# Patient Record
Sex: MALE | Race: ASIAN
[De-identification: names, ages, dates, MRNs, and addresses within clinical notes are randomized per-mention and may not be internally consistent; named-entity substitution may affect disease eponyms.]

---

## 2019-01-29 ENCOUNTER — HOSPITAL ENCOUNTER (INPATIENT)
Dept: HOSPITAL 63 - ER | Age: 59
LOS: 3 days | Discharge: HOME | DRG: 193 | End: 2019-02-01
Attending: INTERNAL MEDICINE | Admitting: INTERNAL MEDICINE
Payer: SELF-PAY

## 2019-01-29 VITALS — BODY MASS INDEX: 27.97 KG/M2 | HEIGHT: 62 IN | WEIGHT: 152 LBS

## 2019-01-29 VITALS — SYSTOLIC BLOOD PRESSURE: 136 MMHG | DIASTOLIC BLOOD PRESSURE: 64 MMHG

## 2019-01-29 VITALS — SYSTOLIC BLOOD PRESSURE: 154 MMHG | DIASTOLIC BLOOD PRESSURE: 83 MMHG

## 2019-01-29 VITALS — SYSTOLIC BLOOD PRESSURE: 138 MMHG | DIASTOLIC BLOOD PRESSURE: 77 MMHG

## 2019-01-29 DIAGNOSIS — Z87.891: ICD-10-CM

## 2019-01-29 DIAGNOSIS — J96.22: ICD-10-CM

## 2019-01-29 DIAGNOSIS — M10.9: ICD-10-CM

## 2019-01-29 DIAGNOSIS — M41.9: ICD-10-CM

## 2019-01-29 DIAGNOSIS — N17.9: ICD-10-CM

## 2019-01-29 DIAGNOSIS — J45.901: ICD-10-CM

## 2019-01-29 DIAGNOSIS — I13.0: ICD-10-CM

## 2019-01-29 DIAGNOSIS — E78.5: ICD-10-CM

## 2019-01-29 DIAGNOSIS — J96.21: ICD-10-CM

## 2019-01-29 DIAGNOSIS — J44.1: ICD-10-CM

## 2019-01-29 DIAGNOSIS — I50.9: ICD-10-CM

## 2019-01-29 DIAGNOSIS — Z82.3: ICD-10-CM

## 2019-01-29 DIAGNOSIS — J44.0: ICD-10-CM

## 2019-01-29 DIAGNOSIS — N18.9: ICD-10-CM

## 2019-01-29 DIAGNOSIS — J18.9: Primary | ICD-10-CM

## 2019-01-29 DIAGNOSIS — Z80.42: ICD-10-CM

## 2019-01-29 LAB
ALBUMIN SERPL-MCNC: 3.6 G/DL (ref 3.4–5)
ALBUMIN/GLOB SERPL: 1 {RATIO} (ref 1–1.7)
ALP SERPL-CCNC: 58 U/L (ref 46–116)
ALT SERPL-CCNC: 39 U/L (ref 16–63)
ANION GAP SERPL CALC-SCNC: 7 MMOL/L (ref 6–14)
APTT PPP: YELLOW S
AST SERPL-CCNC: 19 U/L (ref 15–37)
BACTERIA #/AREA URNS HPF: 0 /HPF
BASOPHILS # BLD AUTO: 0 X10^3/UL (ref 0–0.2)
BASOPHILS NFR BLD: 0 % (ref 0–3)
BGAS PCO2: 48 MMHG (ref 35–46)
BGAS PH: 7.37 (ref 7.35–7.46)
BGAS PO2: 74 MMHG (ref 80–100)
BILIRUB SERPL-MCNC: 0.6 MG/DL (ref 0.2–1)
BILIRUB UR QL STRIP: (no result)
BUN/CREAT SERPL: 21 (ref 6–20)
CA-I SERPL ISE-MCNC: 30 MG/DL (ref 8–26)
CALCIUM SERPL-MCNC: 8.2 MG/DL (ref 8.5–10.1)
CHLORIDE SERPL-SCNC: 97 MMOL/L (ref 98–107)
CO2 SERPL-SCNC: 30 MMOL/L (ref 21–32)
CREAT SERPL-MCNC: 1.4 MG/DL (ref 0.7–1.3)
DELTA BASE BGAS: 3 MMOL/L (ref 0–3)
EOSINOPHIL NFR BLD: 0.1 X10^3/UL (ref 0–0.7)
EOSINOPHIL NFR BLD: 1 % (ref 0–3)
ERYTHROCYTE [DISTWIDTH] IN BLOOD BY AUTOMATED COUNT: 14.2 % (ref 11.5–14.5)
FIBRINOGEN PPP-MCNC: CLEAR MG/DL
GFR SERPLBLD BASED ON 1.73 SQ M-ARVRAT: 51.9 ML/MIN
GLOBULIN SER-MCNC: 3.5 G/DL (ref 2.2–3.8)
GLUCOSE SERPL-MCNC: 131 MG/DL (ref 70–99)
GLUCOSE UR STRIP-MCNC: (no result) MG/DL
HCT VFR BLD CALC: 52.1 % (ref 39–53)
HGB BLD-MCNC: 17.1 G/DL (ref 13–17.5)
INFLUENZA A PATIENT: NEGATIVE
INFLUENZA B PATIENT: NEGATIVE
LYMPHOCYTES # BLD: 0.4 X10^3/UL (ref 1–4.8)
LYMPHOCYTES NFR BLD AUTO: 4 % (ref 24–48)
MCH RBC QN AUTO: 29 PG (ref 25–35)
MCHC RBC AUTO-ENTMCNC: 33 G/DL (ref 31–37)
MCV RBC AUTO: 90 FL (ref 79–100)
MONO #: 0.7 X10^3/UL (ref 0–1.1)
MONOCYTES NFR BLD: 6 % (ref 0–9)
NEUT #: 9.5 X10^3UL (ref 1.8–7.7)
NEUTROPHILS NFR BLD AUTO: 89 % (ref 31–73)
NITRITE UR QL STRIP: (no result)
O2 SAT BGAS: 94 % (ref 92–99)
O2/TOTAL GAS SETTING VFR VENT: 21 %
PLATELET # BLD AUTO: 255 X10^3/UL (ref 140–400)
POTASSIUM SERPL-SCNC: 4 MMOL/L (ref 3.5–5.1)
PROT SERPL-MCNC: 7.1 G/DL (ref 6.4–8.2)
RBC # BLD AUTO: 5.82 X10^6/UL (ref 4.3–5.7)
RBC #/AREA URNS HPF: 0 /HPF (ref 0–2)
SODIUM SERPL-SCNC: 134 MMOL/L (ref 136–145)
SP GR UR STRIP: 1.01
SQUAMOUS #/AREA URNS LPF: (no result) /LPF
UROBILINOGEN UR-MCNC: 0.2 MG/DL
WBC # BLD AUTO: 10.7 X10^3/UL (ref 4–11)
WBC #/AREA URNS HPF: (no result) /HPF (ref 0–4)

## 2019-01-29 PROCEDURE — 96375 TX/PRO/DX INJ NEW DRUG ADDON: CPT

## 2019-01-29 PROCEDURE — 84484 ASSAY OF TROPONIN QUANT: CPT

## 2019-01-29 PROCEDURE — 85025 COMPLETE CBC W/AUTO DIFF WBC: CPT

## 2019-01-29 PROCEDURE — 80048 BASIC METABOLIC PNL TOTAL CA: CPT

## 2019-01-29 PROCEDURE — 82550 ASSAY OF CK (CPK): CPT

## 2019-01-29 PROCEDURE — 93005 ELECTROCARDIOGRAM TRACING: CPT

## 2019-01-29 PROCEDURE — 82803 BLOOD GASES ANY COMBINATION: CPT

## 2019-01-29 PROCEDURE — 87641 MR-STAPH DNA AMP PROBE: CPT

## 2019-01-29 PROCEDURE — 80053 COMPREHEN METABOLIC PANEL: CPT

## 2019-01-29 PROCEDURE — 94640 AIRWAY INHALATION TREATMENT: CPT

## 2019-01-29 PROCEDURE — 83880 ASSAY OF NATRIURETIC PEPTIDE: CPT

## 2019-01-29 PROCEDURE — 87040 BLOOD CULTURE FOR BACTERIA: CPT

## 2019-01-29 PROCEDURE — 36415 COLL VENOUS BLD VENIPUNCTURE: CPT

## 2019-01-29 PROCEDURE — 96374 THER/PROPH/DIAG INJ IV PUSH: CPT

## 2019-01-29 PROCEDURE — 83605 ASSAY OF LACTIC ACID: CPT

## 2019-01-29 PROCEDURE — 87804 INFLUENZA ASSAY W/OPTIC: CPT

## 2019-01-29 PROCEDURE — 87070 CULTURE OTHR SPECIMN AEROBIC: CPT

## 2019-01-29 PROCEDURE — 71045 X-RAY EXAM CHEST 1 VIEW: CPT

## 2019-01-29 PROCEDURE — 81001 URINALYSIS AUTO W/SCOPE: CPT

## 2019-01-29 PROCEDURE — 87205 SMEAR GRAM STAIN: CPT

## 2019-01-29 RX ADMIN — IPRATROPIUM BROMIDE AND ALBUTEROL SULFATE SCH ML: .5; 3 SOLUTION RESPIRATORY (INHALATION) at 20:24

## 2019-01-29 RX ADMIN — METHYLPREDNISOLONE SODIUM SUCCINATE SCH MG: 40 INJECTION, POWDER, FOR SOLUTION INTRAMUSCULAR; INTRAVENOUS at 21:02

## 2019-01-29 RX ADMIN — AZITHROMYCIN SCH MG: 250 TABLET, FILM COATED ORAL at 17:30

## 2019-01-29 NOTE — HP
ADMIT DATE:  2019



HISTORY OF PRESENT ILLNESS:  The patient is a 58-year-old Sami male patient

who was brought to the Emergency Room by his family as he started complaining of

increasing shortness of breath, cough with yellowish sputum.  He was admitted

recently with acute hypoxic respiratory failure due to community-acquired

pneumonia, was found to have severe emphysema and bronchiectasis together

probably restrictive lung disease due to kyphoscoliosis and was discharged home

on a tapering course of steroids and Levaquin.



PAST MEDICAL HISTORY:  Significant for hypertension, hyperlipidemia as well as

chronic obstructive pulmonary disease, acute-on-chronic hypoxic hypercapnic

respiratory failure, bronchiectasis and severe emphysema as well as gout.



PAST SURGICAL HISTORY:  Unremarkable.



ALLERGIES:  He has no known drug allergies.



CURRENT MEDICATIONS:  On discharge, he was discharged on furosemide 40 mg once a

day, Mucinex 600 mg twice a day, Levaquin 500 mg once a day, potassium chloride

20 mEq once a day and prednisone 40 mg in a tapering course.



FAMILY HISTORY:  Significant for the fact that he has 2 brothers, one of them

has prostate cancer.  The other was healthy.  Two sisters.  His mother  in

her 80s due to CVA.



SOCIAL HISTORY:  Single, never , has no children.  He smoked a pack a day

for 25 years, quit about 15 years ago.  He does not drink alcohol or

recreational drugs.  He apparently has severe scoliosis and was on disability. 

Tuberculosis was suspected before, but further investigation proved that he has

no tuberculosis according to him.



REVIEW OF SYSTEMS:  As per history of present illness.



PHYSICAL EXAMINATION:

GENERAL:  On arrival to the Emergency Room today, he was slightly tachypneic,

but no pallor, jaundice or cyanosis.  No lymphadenopathy, no thyromegaly.  No

jugular venous distention.  No lower limb edema.

VITAL SIGNS:  His heart rate was 76, blood pressure 154/83, temperature was

98.3, respiratory rate was 36, and oxygen saturation on arrival was 88% on room

air that has improved to 96% on 2 liters of oxygen.

HEAD, EYES, EARS, NOSE AND THROAT:  Showed normocephalic, atraumatic.

NECK:  Supple.

HEART:  Showed normal first and second heart sounds.  No gallop or murmur.

CHEST:  Shows central trachea.  The patient has barrel chest with a markedly

reduced chest expansion, reduced air entry, vesicular breath sounds with

crepitation on both bases bilaterally.  Very few scattered rhonchi.

ABDOMEN:  Distended, soft, nontender.

NEUROLOGIC:  He is awake, alert, responding appropriately.  All cranial nerves

intact.

EXTREMITIES:  He moves extremities without difficulty, ambulates without

assistance or assistive devices.



LABORATORY DATA:  On arrival showed a white cell count of 10,700, hemoglobin 17,

hematocrit 52, MCV 90 and platelet count of 255,000.  His serum sodium was 134,

potassium 4, chloride 97, bicarbonate 30, anion gap of 7, BUN 30, creatinine

1.4, estimated GFR was 52 mL per minute, his glucose 131, calcium was 8.2,

lactic acid was only 1.3.  Total bilirubin, AST, ALT, alkaline phosphatase were

normal.  His total protein was 7.1, albumin 3.6.  His blood gases today showed a

pH of 7.37, pCO2 of 48, pO2 of 74, bicarbonate 28, and oxygen saturation was

94%.  Urinalysis was unremarkable and his influenza A and B were negative.  Her

chest x-ray showed that the patient's cardiomediastinal silhouette is normal. 

____ bilateral hilar prominence is unchanged.  Adenopathy noted on prior CT

scan.  Central pulmonary vessels are also prominent.  Lungs are clear.  There is

no pneumothorax, no pleural effusion is appreciated.  No acute bone abnormality.



IMPRESSION:  In summary, this is a 58-year-old Sami male patient who yet again

came would probably acute hypoxic hypercapnic respiratory failure, chronic

obstructive pulmonary disease exacerbation, has severe emphysema and

bronchiectasis, hypertension, hyperlipidemia and gout.  We will continue with IV

antibiotic in the form of Zithromax and Rocephin.  Continue with Solu-Medrol,

nebulized albuterol and Atrovent.  Continue with all his other medications.





______________________________

SINDHU DOUGHERTY MD



DR:  GIANCARLO/davi  JOB#:  [id number] / [id number]

DD:  2019 16:57  DT:  2019 18:15

## 2019-01-29 NOTE — EKG
[facility] [address], [place] [address]

Test Date:    2019               Test Time:    15:12:14

Pat Name:     REGGIE FUENTES              Department:   

Patient ID:   [id number]           Room:         ICU06 1

Gender:       M                        Technician:   RUPESH

:          1960               Requested By: GEOFF LEIAV

Order Number: [id number]            Reading MD:   Subhash Mcnulty MD

                                 Measurements

Intervals                              Axis          

Rate:         93                       P:            56

IN:           142                      QRS:          28

QRSD:         90                       T:            75

QT:           380                                    

QTc:          475                                    

                           Interpretive Statements

SINUS RHYTHM



Electronically Signed On 2019 9:27:38 CST by Subhash Mcnulty MD

## 2019-01-29 NOTE — PHYS DOC
Past History


Past Medical History:  CHF, Hypertension


Past Surgical History:  No Surgical History


Smoking:  Quit Greater Than 1 Year


Alcohol Use:  None


Drug Use:  None





Adult General


Chief Complaint


Chief Complaint:  COUGH





HPI


HPI





Patient is a 58 year old male who presents with complaining of shortness of 

breath and cough since this morning. Patient is from [place] and does not talk in 

condition and history was taking with help of his sister and previous emergency 

room visit. Patient was admitted with diagnosis of hypoxia and pneumonia on 

January 15 and discharged home on  with diagnosis of CHF exacerbation 

of COPD exacerbation and was started on Lasix and prescription for Levaquin and 

prednisone was given. Patient states he follow-up with his medication but 

drinking lots of liquids because of taking Lasix. Patient complaining of 

gradual increase of cough and states since this morning he had productive cough 

with exertional shortness of breath and chest discomfort feeling during 

episodes of cough without fever and chills, nausea and vomiting, diarrhea and 

constipation, urinary symptom.





Review of Systems


Review of Systems





Constitutional: Denies fever or chills []


Eyes: Denies change in visual acuity, redness, or eye pain []


HENT: Denies nasal congestion or sore throat []


Respiratory: Reports cough and shortness of breath]


Cardiovascular: No additional information not addressed in HPI []


GI: Denies abdominal pain, nausea, vomiting, bloody stools or diarrhea []


: Denies dysuria or hematuria []


Musculoskeletal: Denies back pain or joint pain []


Integument: Denies rash or skin lesions []


Neurologic: Denies headache, focal weakness or sensory changes []


Endocrine: Denies polyuria or polydipsia []





All other systems were reviewed and found to be within normal limits, except as 

documented in this note.





Allergies


Allergies





Allergies








Coded Allergies Type Severity Reaction Last Updated Verified


 


  No Known Drug Allergies    1/15/19 No











Physical Exam


Physical Exam





Constitutional: Well developed, well nourished, mild distress, non-toxic 

appearance, O2 sat of 88% at arrival to ER that increased to 95% with rest. []


HENT: Normocephalic, atraumatic, bilateral external ears normal, oropharynx 

moist, no oral exudates, nose normal. []


Eyes: PERRLA, EOMI, conjunctiva normal, no discharge. [] 


Neck: Normal range of motion, no tenderness, supple, no stridor. [] 


Cardiovascular:Heart rate regular rhythm, no murmur []


Lungs & Thorax: Mild respiratory distress with intercostal retraction, decrease 

of air movement, bibasilar crackles


Abdomen: Bowel sounds normal, soft, no tenderness, no masses, no pulsatile 

masses. [] 


Skin: Warm, dry, no erythema, no rash. [] 


Back: No tenderness, no CVA tenderness. [] 


Extremities: No tenderness, no cyanosis, no clubbing, ROM intact, no edema. [] 


Neurologic: Alert and oriented X 3, normal motor function, normal sensory 

function, no focal deficits noted. []


Psychologic: Affect normal, judgement normal, mood normal. []





EKG


EKG


EKG interpreted by me. EKG at 1512 showed normal sinus rhythm at rate of 93, 

poor R-wave progress in anteroseptal leads, likely consistent T-wave 

abnormalities.[]





Radiology/Procedures


Radiology/Procedures


 [facility] [address], [place], [place] [address] [phone]


 


 IMAGING REPORT





 Signed





PATIENT: REGGIE FUENTES ACCOUNT: [id number] MRN#: [id number]


: 1960 LOCATION: ER AGE: 58


SEX: M EXAM DT: 19 ACCESSION#: [id number]


STATUS: REG ER ORD. PHYSICIAN: GEOFF LEIVA MD 


REASON: shortness of breath


PROCEDURE: PORTABLE CHEST 1V





PORTABLE CHEST 1V


 


Clinical Indication: SHORTNESS OF BREATH


 


Comparison:  AP chest and CT chest with contrast, January 15, 2019.


 


Findings: 


Apical lordotic positioning. The cardiomediastinal silhouette is normal. 


Bilateral hilar prominence is unchanged. Adenopathy noted on prior CT. 


Central pulmonary vessels also prominent. Lungs are clear. There is no 


pneumothorax. No pleural effusion is appreciated. No acute bone 


abnormality.


 


IMPRESSION: 


1.  Stable bilateral hilar prominence.


2.  The lungs are clear.


 


 


Electronically signed by: Garth Solorzano MD (2019 3:26 PM) [id number]














DICTATED AND SIGNED BY:     GARTH SOLORZANO MD


DATE:     19 1523





CC: GEOFF LEIVA MD; PCP,NO ~





Course & Med Decision Making


Course & Med Decision Making


Pertinent Labs and Imaging studies reviewed. (See chart for details)





Evaluation of patient in ER showed 58-year-old male patient with history of 

recent hospitalization for CHF and COPD exacerbation presented to ER with 

complaining of shortness of breath and cough increased since this morning. 

Patient had hypoxia that improved with rest and starting oxygen. Chest x-ray 

showed bilateral perihilar fullness without elevation of BNP. Patient treated 

with oxygen, DuoNeb and Solu-Medrol and Lasix and felt better. Plan to admit 

patient with diagnosis of CHF exacerbation. Documented except for admission at 

1512.





Dragon Disclaimer


Dragon Disclaimer


This electronic medical record was generated, in whole or in part, using a 

voice recognition dictation system.





Departure


Departure:


Impression:  


 Primary Impression:  


 Acute respiratory distress


 Additional Impressions:  


 Hypoxia


 Acute exacerbation of CHF (congestive heart failure)


 Acute exacerbation of COPD with asthma


 Renal insufficiency


Disposition:  09 ADMITTED AS INPATIENT (at 1514)


Admitting Physician:  Tanika Brito (accepted admission at 1512)


Condition:  GUARDED


Referrals:  


PCP,NO (PCP)





Problem Qualifiers











GEOFF LEIVA MD 2019 14:50

## 2019-01-29 NOTE — RAD
PORTABLE CHEST 1V

 

Clinical Indication: SHORTNESS OF BREATH

 

Comparison:  AP chest and CT chest with contrast, January 15, 2019.

 

Findings: 

Apical lordotic positioning. The cardiomediastinal silhouette is normal. 

Bilateral hilar prominence is unchanged. Adenopathy noted on prior CT. 

Central pulmonary vessels also prominent. Lungs are clear. There is no 

pneumothorax. No pleural effusion is appreciated. No acute bone 

abnormality.

 

IMPRESSION: 

1.  Stable bilateral hilar prominence.

2.  The lungs are clear.

 

 

Electronically signed by: Garth Solorzano MD (1/29/2019 3:26 PM) [id number]

## 2019-01-30 VITALS — DIASTOLIC BLOOD PRESSURE: 82 MMHG | SYSTOLIC BLOOD PRESSURE: 141 MMHG

## 2019-01-30 VITALS — SYSTOLIC BLOOD PRESSURE: 144 MMHG | DIASTOLIC BLOOD PRESSURE: 73 MMHG

## 2019-01-30 VITALS — DIASTOLIC BLOOD PRESSURE: 76 MMHG | SYSTOLIC BLOOD PRESSURE: 149 MMHG

## 2019-01-30 VITALS — SYSTOLIC BLOOD PRESSURE: 142 MMHG | DIASTOLIC BLOOD PRESSURE: 81 MMHG

## 2019-01-30 VITALS — SYSTOLIC BLOOD PRESSURE: 135 MMHG | DIASTOLIC BLOOD PRESSURE: 79 MMHG

## 2019-01-30 VITALS — SYSTOLIC BLOOD PRESSURE: 145 MMHG | DIASTOLIC BLOOD PRESSURE: 79 MMHG

## 2019-01-30 LAB
ALBUMIN SERPL-MCNC: 3.4 G/DL (ref 3.4–5)
ALBUMIN/GLOB SERPL: 1 {RATIO} (ref 1–1.7)
ALP SERPL-CCNC: 50 U/L (ref 46–116)
ALT SERPL-CCNC: 33 U/L (ref 16–63)
ANION GAP SERPL CALC-SCNC: 8 MMOL/L (ref 6–14)
AST SERPL-CCNC: 14 U/L (ref 15–37)
BASOPHILS # BLD AUTO: 0 X10^3/UL (ref 0–0.2)
BASOPHILS NFR BLD: 0 % (ref 0–3)
BILIRUB SERPL-MCNC: 0.5 MG/DL (ref 0.2–1)
BUN/CREAT SERPL: 20 (ref 6–20)
CA-I SERPL ISE-MCNC: 32 MG/DL (ref 8–26)
CALCIUM SERPL-MCNC: 8.7 MG/DL (ref 8.5–10.1)
CHLORIDE SERPL-SCNC: 98 MMOL/L (ref 98–107)
CO2 SERPL-SCNC: 31 MMOL/L (ref 21–32)
CREAT SERPL-MCNC: 1.6 MG/DL (ref 0.7–1.3)
EOSINOPHIL NFR BLD: 0 % (ref 0–3)
EOSINOPHIL NFR BLD: 0 X10^3/UL (ref 0–0.7)
ERYTHROCYTE [DISTWIDTH] IN BLOOD BY AUTOMATED COUNT: 14 % (ref 11.5–14.5)
GFR SERPLBLD BASED ON 1.73 SQ M-ARVRAT: 44.5 ML/MIN
GLOBULIN SER-MCNC: 3.4 G/DL (ref 2.2–3.8)
GLUCOSE SERPL-MCNC: 157 MG/DL (ref 70–99)
HCT VFR BLD CALC: 50.9 % (ref 39–53)
HGB BLD-MCNC: 16.7 G/DL (ref 13–17.5)
LYMPHOCYTES # BLD: 0.9 X10^3/UL (ref 1–4.8)
LYMPHOCYTES NFR BLD AUTO: 12 % (ref 24–48)
MCH RBC QN AUTO: 30 PG (ref 25–35)
MCHC RBC AUTO-ENTMCNC: 33 G/DL (ref 31–37)
MCV RBC AUTO: 90 FL (ref 79–100)
MONO #: 0.1 X10^3/UL (ref 0–1.1)
MONOCYTES NFR BLD: 2 % (ref 0–9)
NEUT #: 6 X10^3UL (ref 1.8–7.7)
NEUTROPHILS NFR BLD AUTO: 86 % (ref 31–73)
PLATELET # BLD AUTO: 242 X10^3/UL (ref 140–400)
POTASSIUM SERPL-SCNC: 3.6 MMOL/L (ref 3.5–5.1)
PROT SERPL-MCNC: 6.8 G/DL (ref 6.4–8.2)
RBC # BLD AUTO: 5.67 X10^6/UL (ref 4.3–5.7)
SODIUM SERPL-SCNC: 137 MMOL/L (ref 136–145)
WBC # BLD AUTO: 7 X10^3/UL (ref 4–11)

## 2019-01-30 RX ADMIN — METHYLPREDNISOLONE SODIUM SUCCINATE SCH MG: 40 INJECTION, POWDER, FOR SOLUTION INTRAMUSCULAR; INTRAVENOUS at 05:49

## 2019-01-30 RX ADMIN — IPRATROPIUM BROMIDE AND ALBUTEROL SULFATE SCH ML: .5; 3 SOLUTION RESPIRATORY (INHALATION) at 16:00

## 2019-01-30 RX ADMIN — POTASSIUM CHLORIDE SCH MEQ: 1500 TABLET, EXTENDED RELEASE ORAL at 08:36

## 2019-01-30 RX ADMIN — METHYLPREDNISOLONE SODIUM SUCCINATE SCH MG: 40 INJECTION, POWDER, FOR SOLUTION INTRAMUSCULAR; INTRAVENOUS at 21:30

## 2019-01-30 RX ADMIN — AZITHROMYCIN SCH MG: 250 TABLET, FILM COATED ORAL at 08:37

## 2019-01-30 RX ADMIN — IPRATROPIUM BROMIDE AND ALBUTEROL SULFATE SCH ML: .5; 3 SOLUTION RESPIRATORY (INHALATION) at 09:27

## 2019-01-30 RX ADMIN — BENZOCAINE AND MENTHOL PRN LOZ: 15; 3.6 LOZENGE ORAL at 21:31

## 2019-01-30 RX ADMIN — IPRATROPIUM BROMIDE AND ALBUTEROL SULFATE SCH ML: .5; 3 SOLUTION RESPIRATORY (INHALATION) at 05:49

## 2019-01-30 RX ADMIN — METHYLPREDNISOLONE SODIUM SUCCINATE SCH MG: 40 INJECTION, POWDER, FOR SOLUTION INTRAMUSCULAR; INTRAVENOUS at 14:24

## 2019-01-30 RX ADMIN — Medication SCH CAP: at 21:30

## 2019-01-30 RX ADMIN — Medication SCH CAP: at 08:36

## 2019-01-30 RX ADMIN — Medication SCH MG: at 08:36

## 2019-01-30 NOTE — PN
DATE:  01/30/2019



SUBJECTIVE:  The patient is resting, sitting up in his bed, in no apparent

distress.  He is feeling better today.  He does have tachycardia after his

breathing treatment, otherwise his oxygen saturation was 95% on room air.





OBJECTIVE:

GENERAL:  On examining him, he looked well and was clearly in no apparent

respiratory distress.  No pallor, jaundice, cyanosis or thyromegaly.  No jugular

venous distension.  No limb edema.

VITAL SIGNS:  His heart rate was 121, blood pressure was 141/82, temperature was

97.7, respiratory rate was 18 and oxygen saturation was 95% on room air.

HEENT:  Examination of the head, eyes, ears, nose and throat showed

normocephalic, atraumatic.

NECK:  Supple.

HEART:  Showed normal first and second heart sounds.  No gallop, rub or murmur.

CHEST:  Shows central trachea, equally reduced expansion, reduced air entry,

vesicular breath sounds, bilateral basal crepitations and could not appreciate

any rhonchi.

ABDOMEN:  Distended, soft, nontender.  No guarding or rigidity.  No

organomegaly.  Her hernial orifices are intact and bowel sounds normal.

NEUROLOGIC:  He is awake, alert, responding appropriately.  He does not speak

English obviously, but he seemed to be grossly neurologically intact.

EXTREMITIES:  He is up and about, walking without assistance or assistive

devices.



His intake and output was incompletely recorded.





LABORATORY DATA:  His white cell count This morning is down to 7000, hemoglobin

16, hematocrit 50, MCV 90 and platelet count 242,000.  Serum sodium was 137,

potassium 3.6, chloride 98, bicarbonate 31, anion gap of 8, BUN 32, creatinine

1.6, estimated GFR was 44 and glucose 157, calcium was 8.3.  Total bilirubin,

AST, ALT, alkaline phosphatase were normal.  Total protein was 6.8, albumin 3.4.

 His urinalysis was unremarkable.  His influenza A and B were negative.





ASSESSMENT:  This is a 58-year-old Faroese male patient, who was admitted again

with,

1.  Acute-on-chronic hypoxic respiratory failure.

2.  Chronic obstructive pulmonary disease exacerbation.

3.  Severe emphysema.

4.  Bronchiectasis.

5.  Hypertension.

6.  Hyperlipidemia.





PLAN:  We will continue with IV antibiotic, steroids and I will change his

Atrovent, albuterol to be given as needed, and he will probably be discharged

home as he has plans to travel back to [place] after arrangement is made with

airlines for provide oxygen in the flight.





______________________________

SINDHU DOUGHERTY MD



DR:  GIANCARLO/davi  JOB#:  [id number] / [id number]

DD:  01/30/2019 11:08  DT:  01/30/2019 23:16

## 2019-01-31 VITALS — DIASTOLIC BLOOD PRESSURE: 78 MMHG | SYSTOLIC BLOOD PRESSURE: 159 MMHG

## 2019-01-31 VITALS — SYSTOLIC BLOOD PRESSURE: 158 MMHG | DIASTOLIC BLOOD PRESSURE: 84 MMHG

## 2019-01-31 VITALS — DIASTOLIC BLOOD PRESSURE: 83 MMHG | SYSTOLIC BLOOD PRESSURE: 166 MMHG

## 2019-01-31 VITALS — SYSTOLIC BLOOD PRESSURE: 155 MMHG | DIASTOLIC BLOOD PRESSURE: 77 MMHG

## 2019-01-31 VITALS — DIASTOLIC BLOOD PRESSURE: 88 MMHG | SYSTOLIC BLOOD PRESSURE: 152 MMHG

## 2019-01-31 LAB
ANION GAP SERPL CALC-SCNC: 8 MMOL/L (ref 6–14)
CA-I SERPL ISE-MCNC: 52 MG/DL (ref 8–26)
CALCIUM SERPL-MCNC: 8.5 MG/DL (ref 8.5–10.1)
CHLORIDE SERPL-SCNC: 100 MMOL/L (ref 98–107)
CO2 SERPL-SCNC: 28 MMOL/L (ref 21–32)
CREAT SERPL-MCNC: 1.9 MG/DL (ref 0.7–1.3)
GFR SERPLBLD BASED ON 1.73 SQ M-ARVRAT: 36.5 ML/MIN
GLUCOSE SERPL-MCNC: 151 MG/DL (ref 70–99)
POTASSIUM SERPL-SCNC: 4 MMOL/L (ref 3.5–5.1)
SODIUM SERPL-SCNC: 136 MMOL/L (ref 136–145)

## 2019-01-31 RX ADMIN — SODIUM CHLORIDE SCH MLS/HR: 0.9 INJECTION, SOLUTION INTRAVENOUS at 12:17

## 2019-01-31 RX ADMIN — Medication SCH CAP: at 21:53

## 2019-01-31 RX ADMIN — BENZOCAINE AND MENTHOL PRN LOZ: 15; 3.6 LOZENGE ORAL at 08:30

## 2019-01-31 RX ADMIN — METHYLPREDNISOLONE SODIUM SUCCINATE SCH MG: 40 INJECTION, POWDER, FOR SOLUTION INTRAMUSCULAR; INTRAVENOUS at 17:35

## 2019-01-31 RX ADMIN — Medication SCH MG: at 08:28

## 2019-01-31 RX ADMIN — POTASSIUM CHLORIDE SCH MEQ: 1500 TABLET, EXTENDED RELEASE ORAL at 08:27

## 2019-01-31 RX ADMIN — AZITHROMYCIN SCH MG: 250 TABLET, FILM COATED ORAL at 08:28

## 2019-01-31 RX ADMIN — Medication SCH CAP: at 08:28

## 2019-01-31 RX ADMIN — METHYLPREDNISOLONE SODIUM SUCCINATE SCH MG: 40 INJECTION, POWDER, FOR SOLUTION INTRAMUSCULAR; INTRAVENOUS at 06:29

## 2019-01-31 NOTE — PN
DATE:  01/31/2019



SUBJECTIVE:  The patient is sitting on the edge of the bed, comfortably, in no

apparent distress.  He is complaining of sore throat, for which we stated him on

sore throat lozenges.  Unfortunately, his creatinine has risen from 1.4-1.9.  He

continued to be on his Lasix, although all his erythema has completely subsided.



PHYSICAL EXAMINATION:

GENERAL:  When I examined him today, he looked well and was clearly in no

apparent respiratory distress, pale, but no jaundice, cyanosis or thyromegaly. 

No jugular venous distention.  No limb edema.

VITAL SIGNS:  His heart rate was 102, blood pressure 166/83, temperature was

98.9, respiratory rate was 22, and oxygen saturation was 93% on room air.

HEAD, EYES, EARS, NOSE, AND THROAT:  Showed normocephalic, atraumatic.

NECK:  Supple.

HEART:  Showed normal first and second heart sounds.  No gallop, rub, or murmur.

CHEST:  Clear to auscultation.  No crepitation or rhonchi.  He has bilateral

basal crepitation.  I could not appreciate any _____ rhonchi.

ABDOMEN:  Distended, soft.

NEUROLOGIC:  He is awake, alert, responding appropriately.  All cranial nerves

intact.  He moves extremities without difficulty, ambulates without assistance

or assistive devices.



LABORATORY DATA:  His intake and output was incompletely recorded.  His serum

sodium was 136, potassium 4, chloride 100, bicarbonate 28, anion gap of 8, BUN

52, creatinine was 1.9, estimated GFR was 36 mL per minute, his glucose 151,

calcium was 8.5.  His white cell count was 7000, hemoglobin 16, hematocrit 50,

MCV 90, and platelet count 242,000.



ASSESSMENT:  This is a 58-year-old Hebrew male patient who was admitted with:

1.  Acute on chronic hypoxic respiratory failure.

2.  Chronic obstructive pulmonary disease exacerbation.

3.  Severe emphysema.

4.  Bronchiectasis.

5.  Hypertension.

6.  Hyperlipidemia.

7.  Acute on chronic kidney injury.



PLAN:  My plan is to continue with IV antibiotic, cut down steroids to 40 mg

twice a day.  I discontinued his Lasix.  Start him on IV fluid and I am hoping

that if his kidney function returns back to normal tomorrow, he can be

discharged.





______________________________

SINDHU DOUGHERTY MD



DR:  Maura  JOB#:  [id number] / [id number]

DD:  01/31/2019 11:59  DT:  01/31/2019 22:08

## 2019-02-01 VITALS — SYSTOLIC BLOOD PRESSURE: 172 MMHG | DIASTOLIC BLOOD PRESSURE: 91 MMHG

## 2019-02-01 VITALS — DIASTOLIC BLOOD PRESSURE: 91 MMHG | SYSTOLIC BLOOD PRESSURE: 164 MMHG

## 2019-02-01 LAB
ANION GAP SERPL CALC-SCNC: 8 MMOL/L (ref 6–14)
CA-I SERPL ISE-MCNC: 48 MG/DL (ref 8–26)
CALCIUM SERPL-MCNC: 8.1 MG/DL (ref 8.5–10.1)
CHLORIDE SERPL-SCNC: 104 MMOL/L (ref 98–107)
CO2 SERPL-SCNC: 26 MMOL/L (ref 21–32)
CREAT SERPL-MCNC: 1.4 MG/DL (ref 0.7–1.3)
GFR SERPLBLD BASED ON 1.73 SQ M-ARVRAT: 51.9 ML/MIN
GLUCOSE SERPL-MCNC: 118 MG/DL (ref 70–99)
POTASSIUM SERPL-SCNC: 4.4 MMOL/L (ref 3.5–5.1)
SODIUM SERPL-SCNC: 138 MMOL/L (ref 136–145)

## 2019-02-01 RX ADMIN — Medication SCH CAP: at 08:58

## 2019-02-01 RX ADMIN — SODIUM CHLORIDE SCH MLS/HR: 0.9 INJECTION, SOLUTION INTRAVENOUS at 01:53

## 2019-02-01 RX ADMIN — BENZOCAINE AND MENTHOL PRN LOZ: 15; 3.6 LOZENGE ORAL at 08:57

## 2019-02-01 RX ADMIN — AZITHROMYCIN SCH MG: 250 TABLET, FILM COATED ORAL at 08:58

## 2019-02-01 RX ADMIN — METHYLPREDNISOLONE SODIUM SUCCINATE SCH MG: 40 INJECTION, POWDER, FOR SOLUTION INTRAMUSCULAR; INTRAVENOUS at 05:46

## 2019-02-01 RX ADMIN — POTASSIUM CHLORIDE SCH MEQ: 1500 TABLET, EXTENDED RELEASE ORAL at 08:58

## 2019-02-01 NOTE — DS
DATE OF DISCHARGE:  02/01/2019



HOSPITAL COURSE:  The patient is sitting on the edge of the bed, continued to

have some cough with scanty sputum, complained also of sore throat.  Generally

much better.



PHYSICAL EXAMINATION:

GENERAL:  When I examined him, he looked well and was clearly in no apparent

respiratory distress.  No pallor, jaundice, cyanosis, lymphadenopathy or

thyromegaly.  No jugular venous distension.  No limb edema.

VITAL SIGNS:  His heart rate was 83, blood pressure was 164/91, temperature was

98.5, respiratory rate was 18 and oxygen saturation was 94% on room air.

HEAD, EYES, EARS, NOSE AND THROAT:  Showed normocephalic, atraumatic.

NECK:  Supple.

HEART:  Showed normal first and second heart sounds.  No gallop, rub or murmur.

CHEST:  Clear to auscultation.  No crepitation or rhonchi.  He does actually has

bilateral basal crepitation posteriorly.  I could not appreciate any rhonchi.

ABDOMEN:  Distended, soft, nontender.

NEUROLOGIC:  He is awake, alert, responding appropriately.  All cranial nerves

intact.  He moves extremities without difficulty, ambulates without assistance

or assistive devices.



His intake over the last 24 hours was 1330, no output was recorded.



LABORATORY DATA:  As of this morning, his serum sodium was 138, potassium 4.4,

chloride 104, bicarbonate 26, anion gap of 8, BUN 48, creatinine 1.4, estimated

GFR was 52 mL per minute, his glucose 118, calcium was 8.1.  His white cell

count was 7000, hemoglobin 16, hematocrit 50, MCV 90 and platelet count of 242.



DISCHARGE MEDICATIONS:  He was discharged home to continue on azithromycin 250

mg daily for 7 more days and Vantin 200 mg twice a day.  Should continue

amlodipine for Norvasc 10 mg once a day, guaifenesin for Mucinex 600 mg once a

day.  He should also be on a tapering course of steroids in the form Medrol

Dosepak, asked him to stop his furosemide as well as his potassium.



FINAL DISCHARGE DIAGNOSES:

1.  Acute on chronic hypoxic respiratory failure.

2.  Chronic obstructive pulmonary disease exacerbation.

3.  Community-acquired pneumonia.

4.  Severe asthma.

5.  Bronchiectasis.

6.  Hypertension.

7.  Hyperlipidemia.

8.  Acute-on-chronic kidney injury, resolved.



He would continue with his oral antibiotic and tapering course of steroids.  The

family would be flying with [employer], oxygen concentration from the atmospheric

air and they will arrange to book a ticket for him to fly back to [place] as soon

as possible.





______________________________

SINDHU DOUGHERTY MD



DR:  GIANCARLO/davi  JOB#:  [id number] / [id number]

DD:  02/01/2019 11:14  DT:  02/01/2019 11:35